# Patient Record
Sex: MALE | Race: BLACK OR AFRICAN AMERICAN | NOT HISPANIC OR LATINO | URBAN - METROPOLITAN AREA
[De-identification: names, ages, dates, MRNs, and addresses within clinical notes are randomized per-mention and may not be internally consistent; named-entity substitution may affect disease eponyms.]

---

## 2024-06-14 ENCOUNTER — EMERGENCY (EMERGENCY)
Facility: HOSPITAL | Age: 34
LOS: 1 days | Discharge: HOME | End: 2024-06-14
Attending: EMERGENCY MEDICINE
Payer: COMMERCIAL

## 2024-06-14 VITALS
SYSTOLIC BLOOD PRESSURE: 141 MMHG | WEIGHT: 190.04 LBS | HEART RATE: 87 BPM | DIASTOLIC BLOOD PRESSURE: 86 MMHG | OXYGEN SATURATION: 97 % | RESPIRATION RATE: 16 BRPM | TEMPERATURE: 98 F

## 2024-06-14 PROCEDURE — 29125 APPL SHORT ARM SPLINT STATIC: CPT | Mod: LT

## 2024-06-14 PROCEDURE — 90715 TDAP VACCINE 7 YRS/> IM: CPT

## 2024-06-14 PROCEDURE — 26770 TREAT FINGER DISLOCATION: CPT | Mod: 54,F4

## 2024-06-14 PROCEDURE — 73140 X-RAY EXAM OF FINGER(S): CPT

## 2024-06-14 PROCEDURE — 99284 EMERGENCY DEPT VISIT MOD MDM: CPT | Mod: 57

## 2024-06-14 PROCEDURE — 73140 X-RAY EXAM OF FINGER(S): CPT | Mod: 26,59,LT

## 2024-06-14 PROCEDURE — 73130 X-RAY EXAM OF HAND: CPT

## 2024-06-14 PROCEDURE — 90471 IMMUNIZATION ADMIN: CPT

## 2024-06-14 PROCEDURE — 73130 X-RAY EXAM OF HAND: CPT | Mod: 26,LT

## 2024-06-14 PROCEDURE — 99284 EMERGENCY DEPT VISIT MOD MDM: CPT | Mod: 25

## 2024-06-14 RX ORDER — BACITRACIN ZINC 500 UNIT/G
1 OINTMENT IN PACKET (EA) TOPICAL ONCE
Refills: 0 | Status: COMPLETED | OUTPATIENT
Start: 2024-06-14 | End: 2024-06-14

## 2024-06-14 RX ORDER — TETANUS TOXOID, REDUCED DIPHTHERIA TOXOID AND ACELLULAR PERTUSSIS VACCINE, ADSORBED 5; 2.5; 8; 8; 2.5 [IU]/.5ML; [IU]/.5ML; UG/.5ML; UG/.5ML; UG/.5ML
0.5 SUSPENSION INTRAMUSCULAR ONCE
Refills: 0 | Status: COMPLETED | OUTPATIENT
Start: 2024-06-14 | End: 2024-06-14

## 2024-06-14 RX ORDER — IBUPROFEN 200 MG
600 TABLET ORAL ONCE
Refills: 0 | Status: COMPLETED | OUTPATIENT
Start: 2024-06-14 | End: 2024-06-14

## 2024-06-14 RX ADMIN — TETANUS TOXOID, REDUCED DIPHTHERIA TOXOID AND ACELLULAR PERTUSSIS VACCINE, ADSORBED 0.5 MILLILITER(S): 5; 2.5; 8; 8; 2.5 SUSPENSION INTRAMUSCULAR at 19:26

## 2024-06-14 RX ADMIN — Medication 1 APPLICATION(S): at 19:05

## 2024-06-14 RX ADMIN — Medication 600 MILLIGRAM(S): at 19:35

## 2024-06-14 RX ADMIN — Medication 600 MILLIGRAM(S): at 19:05

## 2024-06-14 NOTE — ED PROVIDER NOTE - OBJECTIVE STATEMENT
34-year-old male, presents for evaluation of status post fall earlier today while skateboarding.  landing on both hands.  Denies head injury or LOC.  Denies any pain to the right hand.  Reports pain to the left pinky finger.  Sustained multiple scratches.  Pain is sharp and severe, worse with movement.  Did not take any medication prior to arrival.  Denies any numbness, tingling or focal weakness.  No other injuries or complaints.  Last tetanus immunization unsure.

## 2024-06-14 NOTE — ED PROVIDER NOTE - PHYSICAL EXAMINATION
Left hand exam: Abrasion/superficial skin tear to the palmar aspect of the fifth MCPJ.  mild localized tenderness to palpation.  Left pinky finger decreased range of motion with deformity at the DIP.  Superficial abrasion to the dorsal aspect of the distal phalanx.  No nail involvement.  No subungual hematoma.   Left wrist full range of motion without swelling or tenderness.  No snuffbox tenderness.  Right wrist full range of motion without swelling or tenderness.  No snuffbox tenderness.  No tenderness to the palm of the hand.  No spinal/paraspinal tenderness to palpation.

## 2024-06-14 NOTE — ED PROVIDER NOTE - PATIENT PORTAL LINK FT
You can access the FollowMyHealth Patient Portal offered by [facility] by registering at the following website: [email or web address]. By joining [facility]’s FollowMyHealth portal, you will also be able to view your health information using other applications (apps) compatible with our system.

## 2024-06-14 NOTE — ED ADULT NURSE NOTE - NSFALLUNIVINTERV_ED_ALL_ED
Bed/Stretcher in lowest position, wheels locked, appropriate side rails in place/Call bell, personal items and telephone in reach/Instruct patient to call for assistance before getting out of bed/chair/stretcher/Non-slip footwear applied when patient is off stretcher/[place] to call system/Physically safe environment - no spills, clutter or unnecessary equipment/Purposeful proactive rounding/Room/bathroom lighting operational, light cord in reach

## 2024-06-14 NOTE — ED PROVIDER NOTE - CARE PLAN
Principal Discharge DX:	Dislocation of finger, left, closed  Secondary Diagnosis:	Abrasion of left hand   1

## 2024-06-14 NOTE — ED PROVIDER NOTE - NSFOLLOWUPINSTRUCTIONS_ED_ALL_ED_FT
Follow-up with the hand orthopedist within 1 week.    For pain you can take over the counter Ibuprofen 600 mg orally every 6 hours as needed for pain. Take medication with food.     If you experience any new or worsening symptoms or if you are concerned you can always come back to the emergency for a re-evaluation.  Some results may not be available at the time of your discharge from the hospital. You can download the FOLLOW MY HEALTH charles and have access to these results.  **Official radiology report by the radiologist will be available within 24 hours. If there is a discrepancy you will contacted.

## 2024-06-14 NOTE — ED PROVIDER NOTE - CLINICAL SUMMARY MEDICAL DECISION MAKING FREE TEXT BOX
34-year-old male, presents status post mechanical fall.  Neurovascular intact.  No head injury.  X-ray shows dislocation of the left fifth digit at the DIP with successful reduction.  No fracture.  Abrasions cleaned and bacitracin dressing applied.  Aluminum finger splint applied.  Will discharge with outpatient Ortho hand follow-up within 1 week.

## 2024-06-14 NOTE — ED PROVIDER NOTE - PROVIDER TOKENS
PROVIDER:[TOKEN:[[id number]]],PROVIDER:[TOKEN:[[id number]]],PROVIDER:[TOKEN:[[id number]]]

## 2024-06-14 NOTE — ED PROVIDER NOTE - CARE PROVIDERS DIRECT ADDRESSES
[email or web address]_Unknown

## 2024-06-14 NOTE — ED PROVIDER NOTE - ATTENDING APP SHARED VISIT CONTRIBUTION OF CARE
Agree with NP H&P.  34-year-old male presents with  left 5th digit pain after fall while skate boarding.  + deformity on exam.  Xray, analgesia and reassess.  likely dislocation.

## 2024-06-14 NOTE — ED PROVIDER NOTE - CARE PROVIDER_API CALL
All Martinez  Orthopaedic Surgery  [address] [place] [address]  Phone: [phone]  Fax: [phone]  Follow Up Time:     Get Mcconnell  Orthopaedic Surgery  [address]  [place] [address]  Phone: [phone]  Fax: [phone]  Follow Up Time:     Robert Vigil  Surgery  [address] [place] [address]  Phone: [phone]  Fax: [phone]  Follow Up Time:

## 2024-06-20 ENCOUNTER — APPOINTMENT (OUTPATIENT)
Dept: ORTHOPEDIC SURGERY | Facility: CLINIC | Age: 34
End: 2024-06-20
Payer: COMMERCIAL

## 2024-06-20 VITALS
DIASTOLIC BLOOD PRESSURE: 67 MMHG | BODY MASS INDEX: 24.38 KG/M2 | SYSTOLIC BLOOD PRESSURE: 118 MMHG | HEIGHT: 74 IN | HEART RATE: 73 BPM | WEIGHT: 190 LBS

## 2024-06-20 DIAGNOSIS — S63.297A: ICD-10-CM

## 2024-06-20 PROBLEM — Z00.00 ENCOUNTER FOR PREVENTIVE HEALTH EXAMINATION: Status: ACTIVE | Noted: 2024-06-20

## 2024-06-20 PROCEDURE — 99203 OFFICE O/P NEW LOW 30 MIN: CPT

## 2024-06-20 NOTE — PHYSICAL EXAM
[de-identified] :  - Constitutional: This is a male in no obvious distress.   - Psych: Patient is alert and oriented to person, place and time.  Patient has a normal mood and affect. - Cardiovascular: Normal pulses throughout the upper extremities.  No significant varicosities are noted in the upper extremities.  - Neuro: Strength and sensation are intact throughout the upper extremities.  Patient has normal coordination. - Respiratory:  Patient exhibits no evidence of shortness of breath or difficulty breathing. - Skin: No rashes, lesions, or other abnormalities are noted in the upper extremities. ---  Examination of his left little finger demonstrates mild swelling and tenderness along the left little finger DIP joint.  There is also mild swelling and tenderness along the left ring finger distal phalanx.  He has full extension of the digits.  He has limitation of flexion of the left little finger DIP joint but his flexor and extensor tendons are intact and there is no instability of the DIP joint collateral ligaments.  There is an abrasion along the dorsal aspect of the left little finger DIP joint and palmarly in the region of the left little finger A1 pulley.  There are no signs of infection.  He is neurovascularly intact distally. [de-identified] : I reviewed x-rays of the left little finger dated 6/14/24 which demonstrate a dorsal ulnar DIP joint dislocation without an associated fracture. His ring finger was visualized on the x-rays and no fractures were noted.  I reviewed post-reduction x-rays which demonstrate his dislocation to be reduced in good alignment.

## 2024-06-20 NOTE — HISTORY OF PRESENT ILLNESS
[Right] : right hand dominant [FreeTextEntry1] : He comes in today for evaluation of a left little finger injury sustained while skating 6 days ago. He went to [facility] where x-rays were obtained, the fracture, was reduced, and he was placed in a splint. He is having some mild pain, but denies any radiating pain, numbness, or tingling.

## 2024-06-20 NOTE — DISCUSSION/SUMMARY
[FreeTextEntry1] : He has findings consistent with a left little finger DIP joint dislocation after a fall 6 days ago.  The joint is reduced and stable.  There is no associated fracture.   I had a discussion with the patient regarding today's visit, the prognosis of this diagnosis, and treatment recommendations and options. At this time, he instructed on splinting as needed, mobility exercises, and activity modification.  He will follow up in one month according to symptoms.  The patient has agreed to the above plan of management and has expressed full understanding.  All questions were fully answered to the patient's satisfaction.   My cumulative time spent on this visit included: Preparation for the visit, review of the medical records, review of pertinent diagnostic studies, examination and counseling of the patient on the above diagnosis, treatment plan and prognosis, orders of diagnostic tests, medication and/or appropriate procedures and documentation in the medical records of today's visit.

## 2024-06-20 NOTE — ADDENDUM
[FreeTextEntry1] : I, Will Kirkpatrick, acted solely as a scribe for Dr. Wilkins on this date on 06/20/2024.

## 2024-06-20 NOTE — END OF VISIT
[FreeTextEntry3] : This note was written by Will Kirkpatrick on 06/20/2024 acting solely as a scribe for Dr. Faraz Wilkins.   All medical record entries made by the Scribe were at my, Dr. Faraz Wilkins, direction and personally dictated by me on 06/20/2024. I have personally reviewed the chart and agree that the record accurately reflects my personal performance of the history, physical exam, assessment and plan.